# Patient Record
Sex: FEMALE | Race: WHITE | Employment: OTHER | URBAN - METROPOLITAN AREA
[De-identification: names, ages, dates, MRNs, and addresses within clinical notes are randomized per-mention and may not be internally consistent; named-entity substitution may affect disease eponyms.]

---

## 2017-11-07 ENCOUNTER — OFFICE VISIT (OUTPATIENT)
Dept: OBGYN CLINIC | Age: 47
End: 2017-11-07

## 2017-11-07 VITALS
SYSTOLIC BLOOD PRESSURE: 114 MMHG | BODY MASS INDEX: 21.26 KG/M2 | WEIGHT: 120 LBS | DIASTOLIC BLOOD PRESSURE: 76 MMHG | HEIGHT: 63 IN

## 2017-11-07 DIAGNOSIS — Z01.419 ENCOUNTER FOR GYNECOLOGICAL EXAMINATION WITHOUT ABNORMAL FINDING: Primary | ICD-10-CM

## 2017-11-07 NOTE — PROGRESS NOTES
Annual exam ages 40-58    Ul. Hebert Bedoya is a ,  56 y.o. female [facility] No LMP recorded. Patient has had an ablation. .    She presents for her annual checkup. She is having no significant problems. With regard to the Gardasil vaccine, she is older than the age for which it is FDA approved. Menstrual status:    Her periods are nonexistent in flow due to having an ablation. She denies dysmenorrhea. She reports no premenstrual symptoms. Contraception:    The current method of family planning is tubal ligation. Sexual history:    She  reports that she currently engages in sexual activity and has had male partners. She reports using the following method of birth control/protection: Surgical.    Medical conditions:    Since her last annual GYN exam about one year ago, she has not the following changes in her health history: none. Pap and Mammogram History:    Her most recent Pap smear was normal, obtained 1 year(s) ago. The patient had her mammogram today in our office. Breast Cancer History/Substance Abuse: negative    Osteoporosis History:    Family history does not include a first or second degree relative with osteopenia or osteoporosis. A bone density scan has not been obtained. Past Medical History:   Diagnosis Date    Amenorrhea      Past Surgical History:   Procedure Laterality Date    HX  SECTION      x2    HX HYSTEROSCOPY WITH ENDOMETRIAL ABLATION      w/ D&C    HX TUBAL LIGATION  09         Allergies: Review of patient's allergies indicates no known allergies. Tobacco History:  reports that she has never smoked. She has never used smokeless tobacco.  Alcohol Abuse:  reports that she does not drink alcohol. Drug Abuse:  reports that she does not use illicit drugs.     Family Medical/Cancer History:   Family History   Problem Relation Age of Onset    Diabetes Father      Type II        Review of Systems - History obtained from the patient  Constitutional: negative for weight loss, fever, night sweats  HEENT: negative for hearing loss, earache, congestion, snoring, sorethroat  CV: negative for chest pain, palpitations, edema  Resp: negative for cough, shortness of breath, wheezing  GI: negative for change in bowel habits, abdominal pain, black or bloody stools  : negative for frequency, dysuria, hematuria, vaginal discharge  MSK: negative for back pain, joint pain, muscle pain  Breast: negative for breast lumps, nipple discharge, galactorrhea  Skin :negative for itching, rash, hives  Neuro: negative for dizziness, headache, confusion, weakness  Psych: negative for anxiety, depression, change in mood  Heme/lymph: negative for bleeding, bruising, pallor    Physical Exam    Visit Vitals    /76    Ht 5' 3\" (1.6 m)    Wt 120 lb (54.4 kg)    BMI 21.26 kg/m2       Constitutional  · Appearance: well-nourished, well developed, alert, in no acute distress    HENT  · Head and Face: appears normal    Neck  · Inspection/Palpation: normal appearance, no masses or tenderness  · Lymph Nodes: no lymphadenopathy present  · Thyroid: gland size normal, nontender, no nodules or masses present on palpation    Chest  · Respiratory Effort: breathing unlabored  · Auscultation: normal breath sounds    Cardiovascular  · Heart:  · Auscultation: regular rate and rhythm without murmur    Breasts  · Inspection of Breasts: breasts symmetrical, no skin changes, no discharge present, nipple appearance normal, no skin retraction present  · Palpation of Breasts and Axillae: no masses present on palpation, no breast tenderness  · Axillary Lymph Nodes: no lymphadenopathy present    Gastrointestinal  · Abdominal Examination: abdomen non-tender to palpation, normal bowel sounds, no masses present  · Liver and spleen: no hepatomegaly present, spleen not palpable  · Hernias: no hernias identified    Genitourinary  · External Genitalia: normal appearance for age, no discharge present, no tenderness present, no inflammatory lesions present, no masses present, no atrophy present  · Vagina: normal vaginal vault without central or paravaginal defects, no discharge present, no inflammatory lesions present, no masses present  · Bladder: non-tender to palpation  · Urethra: appears normal  · Cervix: normal   · Uterus: normal size, shape and consistency  · Adnexa: no adnexal tenderness present, no adnexal masses present  · Perineum: perineum within normal limits, no evidence of trauma, no rashes or skin lesions present  · Anus: anus within normal limits, no hemorrhoids present  · Inguinal Lymph Nodes: no lymphadenopathy present    Skin  · General Inspection: no rash, no lesions identified    Neurologic/Psychiatric  · Mental Status:  · Orientation: grossly oriented to person, place and time  · Mood and Affect: mood normal, affect appropriate    Assessment:  Routine gynecologic examination  Her current medical status is satisfactory with no evidence of significant gynecologic issues.     Plan:  Counseled re: diet, exercise, healthy lifestyle  Return for yearly wellness visits  Rec annual mammogram

## 2017-11-07 NOTE — PATIENT INSTRUCTIONS

## 2018-12-26 NOTE — PROGRESS NOTES
Annual exam ages 40-58    Ul. Hebert Bedoya is a ,  53 y.o. female [facility] No LMP recorded. Patient has had an ablation. She presents for her annual checkup. She is having no significant problems. With regard to the Gardasil vaccine, she is older than the age for which it is FDA approved. Menstrual status:    Her periods are nonexistent in flow due to having an ablation. She denies dysmenorrhea. She reports no premenstrual symptoms. Contraception:    The current method of family planning is tubal ligation. Sexual history:    She  reports that she currently engages in sexual activity and has had partners who are Male. She reports using the following method of birth control/protection: Surgical.    Medical conditions:    Since her last annual GYN exam about one year ago, she has not the following changes in her health history: none. Pap and Mammogram History:    Her most recent Pap smear was normal, obtained 2 year(s) ago. The patient had her mammogram today in our office. Breast Cancer History/Substance Abuse: negative    Osteoporosis History:    Family history does not include a first or second degree relative with osteopenia or osteoporosis. A bone density scan has not been obtained. Past Medical History:   Diagnosis Date    Amenorrhea      Past Surgical History:   Procedure Laterality Date    HX  SECTION      x2    HX HYSTEROSCOPY WITH ENDOMETRIAL ABLATION      w/ D&C    HX TUBAL LIGATION  09         Allergies: Patient has no known allergies. Tobacco History:  reports that  has never smoked. she has never used smokeless tobacco.  Alcohol Abuse:  reports that she does not drink alcohol. Drug Abuse:  reports that she does not use drugs.     Family Medical/Cancer History:   Family History   Problem Relation Age of Onset    Diabetes Father         Type II        Review of Systems - History obtained from the patient  Constitutional: negative for weight loss, fever, night sweats  HEENT: negative for hearing loss, earache, congestion, snoring, sorethroat  CV: negative for chest pain, palpitations, edema  Resp: negative for cough, shortness of breath, wheezing  GI: negative for change in bowel habits, abdominal pain, black or bloody stools  : negative for frequency, dysuria, hematuria, vaginal discharge  MSK: negative for back pain, joint pain, muscle pain  Breast: negative for breast lumps, nipple discharge, galactorrhea  Skin :negative for itching, rash, hives  Neuro: negative for dizziness, headache, confusion, weakness  Psych: negative for anxiety, depression, change in mood  Heme/lymph: negative for bleeding, bruising, pallor    Physical Exam    Visit Vitals  /68   Ht 5' 3\" (1.6 m)   Wt 118 lb (53.5 kg)   BMI 20.90 kg/m²       Constitutional  · Appearance: well-nourished, well developed, alert, in no acute distress    HENT  · Head and Face: appears normal    Neck  · Inspection/Palpation: normal appearance, no masses or tenderness  · Lymph Nodes: no lymphadenopathy present  · Thyroid: gland size normal, nontender, no nodules or masses present on palpation    Chest  · Respiratory Effort: breathing unlabored  · Auscultation: normal breath sounds    Cardiovascular  · Heart:  · Auscultation: regular rate and rhythm without murmur    Breasts  · Inspection of Breasts: breasts symmetrical, no skin changes, no discharge present, nipple appearance normal, no skin retraction present  · Palpation of Breasts and Axillae: no masses present on palpation, no breast tenderness  · Axillary Lymph Nodes: no lymphadenopathy present    Gastrointestinal  · Abdominal Examination: abdomen non-tender to palpation, normal bowel sounds, no masses present  · Liver and spleen: no hepatomegaly present, spleen not palpable  · Hernias: no hernias identified    Genitourinary  · External Genitalia: normal appearance for age, no discharge present, no tenderness present, no inflammatory lesions present, no masses present, no atrophy present  · Vagina: normal vaginal vault without central or paravaginal defects, no discharge present, no inflammatory lesions present, no masses present  · Bladder: non-tender to palpation  · Urethra: appears normal  · Cervix: normal   · Uterus: normal size, shape and consistency  · Adnexa: no adnexal tenderness present, no adnexal masses present  · Perineum: perineum within normal limits, no evidence of trauma, no rashes or skin lesions present  · Anus: anus within normal limits, no hemorrhoids present  · Inguinal Lymph Nodes: no lymphadenopathy present    Skin  · General Inspection: no rash, no lesions identified    Neurologic/Psychiatric  · Mental Status:  · Orientation: grossly oriented to person, place and time  · Mood and Affect: mood normal, affect appropriate    Assessment:  Routine gynecologic examination  Her current medical status is satisfactory with no evidence of significant gynecologic issues.     Plan:  Counseled re: diet, exercise, healthy lifestyle  Return for yearly wellness visits  Rec annual mammogram

## 2019-01-02 ENCOUNTER — OFFICE VISIT (OUTPATIENT)
Dept: OBGYN CLINIC | Age: 49
End: 2019-01-02

## 2019-01-02 VITALS
WEIGHT: 118 LBS | BODY MASS INDEX: 20.91 KG/M2 | SYSTOLIC BLOOD PRESSURE: 108 MMHG | HEIGHT: 63 IN | DIASTOLIC BLOOD PRESSURE: 68 MMHG

## 2019-01-02 DIAGNOSIS — Z01.419 ENCOUNTER FOR GYNECOLOGICAL EXAMINATION WITHOUT ABNORMAL FINDING: Primary | ICD-10-CM

## 2019-01-02 NOTE — PATIENT INSTRUCTIONS

## 2020-02-27 NOTE — PROGRESS NOTES
Annual exam ages 40-58      Ul. Hebert Bedoya is a ,  51 y.o. female   No LMP recorded. Patient has had an ablation. She presents for her annual checkup. She is having no significant problems. Father  this year with ALS. Son going to college soon--? [employer]. With regard to the Gardasil vaccine, she is older than the FDA approved age to receive it. Menstrual status:    Her periods are nonexistent in flow due to having an ablation in the past.     She does not have dysmenorrhea. She reports no premenstrual symptoms. Contraception:    The current method of family planning is tubal ligation. Hormonal status:  She reports no perimenstrual type symptoms. She is not having vasomotor symptoms. The patient is not using any ERT. Sexual history:    She  reports being sexually active and has had partner(s) who are Male. She reports using the following method of birth control/protection: Surgical.    Medical conditions:    Since her last annual GYN exam about one year ago, she has not the following changes in her health history: none. Surgical history confirmed with patient. has a past surgical history that includes hx  section; hx hysteroscopy with endometrial ablation (); and hx tubal ligation (09). Pap and Mammogram History:    Her most recent Pap smear was normal, obtained 3 year(s) ago. The patient had her mammogram today in our office. Breast Cancer History/Substance Abuse: negative      Osteoporosis History:    Family history does not include a first or second degree relative with osteopenia or osteoporosis. A bone density scan has not been obtained     Past Medical History:   Diagnosis Date    Amenorrhea      Past Surgical History:   Procedure Laterality Date    HX  SECTION      x2    HX HYSTEROSCOPY WITH ENDOMETRIAL ABLATION      w/ D&C    HX TUBAL LIGATION  09         Allergies: Patient has no known allergies. Tobacco History:  reports that she has never smoked. She has never used smokeless tobacco.  Alcohol Abuse:  reports no history of alcohol use. Drug Abuse:  reports no history of drug use.     Family Medical/Cancer History:   Family History   Problem Relation Age of Onset    Diabetes Father         Type II        Review of Systems - History obtained from the patient  Constitutional: negative for weight loss, fever, night sweats  HEENT: negative for hearing loss, earache, congestion, snoring, sorethroat  CV: negative for chest pain, palpitations, edema  Resp: negative for cough, shortness of breath, wheezing  GI: negative for change in bowel habits, abdominal pain, black or bloody stools  : negative for frequency, dysuria, hematuria, vaginal discharge  MSK: negative for back pain, joint pain, muscle pain  Breast: negative for breast lumps, nipple discharge, galactorrhea  Skin :negative for itching, rash, hives  Neuro: negative for dizziness, headache, confusion, weakness  Psych: negative for anxiety, depression, change in mood  Heme/lymph: negative for bleeding, bruising, pallor    Physical Exam    Visit Vitals  /72   Ht 5' 3\" (1.6 m)   Wt 119 lb (54 kg)   BMI 21.08 kg/m²       Constitutional  · Appearance: well-nourished, well developed, alert, in no acute distress    HENT  · Head and Face: appears normal    Neck  · Inspection/Palpation: normal appearance, no masses or tenderness  · Lymph Nodes: no lymphadenopathy present  · Thyroid: gland size normal, nontender, no nodules or masses present on palpation    Chest  · Respiratory Effort: breathing unlabored  · Auscultation: normal breath sounds    Cardiovascular  · Heart:  · Auscultation: regular rate and rhythm without murmur    Breasts  · Inspection of Breasts: breasts symmetrical, no skin changes, no discharge present, nipple appearance normal, no skin retraction present  · Palpation of Breasts and Axillae: no masses present on palpation, no breast tenderness  · Axillary Lymph Nodes: no lymphadenopathy present    Gastrointestinal  · Abdominal Examination: abdomen non-tender to palpation, normal bowel sounds, no masses present  · Liver and spleen: no hepatomegaly present, spleen not palpable  · Hernias: no hernias identified    Genitourinary  · External Genitalia: normal appearance for age, no discharge present, no tenderness present, no inflammatory lesions present, no masses present, no atrophy present  · Vagina: normal vaginal vault without central or paravaginal defects, no discharge present, no inflammatory lesions present, no masses present  · Bladder: non-tender to palpation  · Urethra: appears normal  · Cervix: normal   · Uterus: normal size, shape and consistency  · Adnexa: no adnexal tenderness present, no adnexal masses present  · Perineum: perineum within normal limits, no evidence of trauma, no rashes or skin lesions present  · Anus: anus within normal limits, no hemorrhoids present  · Inguinal Lymph Nodes: no lymphadenopathy present    Skin  · General Inspection: no rash, no lesions identified    Neurologic/Psychiatric  · Mental Status:  · Orientation: grossly oriented to person, place and time  · Mood and Affect: mood normal, affect appropriate    Assessment:  Routine gynecologic examination  Her current medical status is satisfactory with no evidence of significant gynecologic issues.     Plan:  Counseled re: diet, exercise, healthy lifestyle  Return for yearly wellness visits  Rec annual mammogram

## 2020-03-04 ENCOUNTER — OFFICE VISIT (OUTPATIENT)
Dept: OBGYN CLINIC | Age: 50
End: 2020-03-04

## 2020-03-04 VITALS
WEIGHT: 119 LBS | BODY MASS INDEX: 21.09 KG/M2 | HEIGHT: 63 IN | SYSTOLIC BLOOD PRESSURE: 118 MMHG | DIASTOLIC BLOOD PRESSURE: 72 MMHG

## 2020-03-04 DIAGNOSIS — Z01.419 ENCOUNTER FOR GYNECOLOGICAL EXAMINATION WITHOUT ABNORMAL FINDING: Primary | ICD-10-CM

## 2020-03-08 LAB
CYTOLOGIST CVX/VAG CYTO: NORMAL
CYTOLOGY CVX/VAG DOC CYTO: NORMAL
CYTOLOGY CVX/VAG DOC THIN PREP: NORMAL
CYTOLOGY HISTORY:: NORMAL
DX ICD CODE: NORMAL
HPV I/H RISK 1 DNA CVX QL PROBE+SIG AMP: NEGATIVE
Lab: NORMAL
OTHER STN SPEC: NORMAL
STAT OF ADQ CVX/VAG CYTO-IMP: NORMAL

## 2021-03-15 NOTE — PROGRESS NOTES
Annual exam ages 40-58      Ul. Hebert Bedoya is a ,  53 y.o. female   No LMP recorded. Patient has had an ablation. She presents for her annual checkup. She is having hot flashes and believes she is in menopause. With regard to the Gardasil vaccine, she is older than the FDA approved age to receive it. Menstrual status:    Her periods are absent in flow due to having an ablation in the past.     She reports no premenstrual symptoms. Contraception:    The current method of family planning is tubal ligation. Hormonal status:  She reports no perimenstrual type symptoms. She is not having vasomotor symptoms. The patient is not using any ERT. Sexual history:    She  reports being sexually active and has had partner(s) who are Male. She reports using the following method of birth control/protection: Surgical.    Medical conditions:    Since her last annual GYN exam about one year ago, she has not the following changes in her health history: none. Surgical history confirmed with patient. has a past surgical history that includes hx  section; hx hysteroscopy with endometrial ablation (); and hx tubal ligation (09). Pap and Mammogram History:    Her most recent Pap smear was normal, obtained 1 year(s) ago. The patient had her mammogram today in our office. Breast Cancer History/Substance Abuse: negative      Osteoporosis History:    Family history does not include a first or second degree relative with osteopenia or osteoporosis. A bone density scan has not been obtained    Past Medical History:   Diagnosis Date    Amenorrhea      Past Surgical History:   Procedure Laterality Date    HX  SECTION      x2    HX HYSTEROSCOPY WITH ENDOMETRIAL ABLATION      w/ D&C    HX TUBAL LIGATION  09         Allergies: Patient has no known allergies. Tobacco History:  reports that she has never smoked.  She has never used smokeless tobacco.  Alcohol Abuse:  reports no history of alcohol use. Drug Abuse:  reports no history of drug use.     Family Medical/Cancer History:   Family History   Problem Relation Age of Onset    Diabetes Father         Type II        Review of Systems - History obtained from the patient  Constitutional: negative for weight loss, fever, night sweats  HEENT: negative for hearing loss, earache, congestion, snoring, sorethroat  CV: negative for chest pain, palpitations, edema  Resp: negative for cough, shortness of breath, wheezing  GI: negative for change in bowel habits, abdominal pain, black or bloody stools  : negative for frequency, dysuria, hematuria, vaginal discharge  MSK: negative for back pain, joint pain, muscle pain  Breast: negative for breast lumps, nipple discharge, galactorrhea  Skin :negative for itching, rash, hives  Neuro: negative for dizziness, headache, confusion, weakness  Psych: negative for anxiety, depression, change in mood  Heme/lymph: negative for bleeding, bruising, pallor    Physical Exam    Visit Vitals  /80   Ht 5' 3\" (1.6 m)   Wt 123 lb (55.8 kg)   BMI 21.79 kg/m²       Constitutional  · Appearance: well-nourished, well developed, alert, in no acute distress    HENT  · Head and Face: appears normal    Neck  · Inspection/Palpation: normal appearance, no masses or tenderness  · Lymph Nodes: no lymphadenopathy present  · Thyroid: gland size normal, nontender, no nodules or masses present on palpation    Chest  · Respiratory Effort: breathing unlabored  · Auscultation: normal breath sounds    Cardiovascular  · Heart:  · Auscultation: regular rate and rhythm without murmur    Breasts  · Inspection of Breasts: breasts symmetrical, no skin changes, no discharge present, nipple appearance normal, no skin retraction present  · Palpation of Breasts and Axillae: no masses present on palpation, no breast tenderness  · Axillary Lymph Nodes: no lymphadenopathy present    Gastrointestinal  · Abdominal Examination: abdomen non-tender to palpation, normal bowel sounds, no masses present  · Liver and spleen: no hepatomegaly present, spleen not palpable  · Hernias: no hernias identified    Genitourinary  · External Genitalia: normal appearance for age, no discharge present, no tenderness present, no inflammatory lesions present, no masses present, no atrophy present  · Vagina: normal vaginal vault without central or paravaginal defects, no discharge present, no inflammatory lesions present, no masses present  · Bladder: non-tender to palpation  · Urethra: appears normal  · Cervix: normal   · Uterus: normal size, shape and consistency  · Adnexa: no adnexal tenderness present, no adnexal masses present  · Perineum: perineum within normal limits, no evidence of trauma, no rashes or skin lesions present  · Anus: anus within normal limits, no hemorrhoids present  · Inguinal Lymph Nodes: no lymphadenopathy present    Skin  · General Inspection: no rash, no lesions identified    Neurologic/Psychiatric  · Mental Status:  · Orientation: grossly oriented to person, place and time  · Mood and Affect: mood normal, affect appropriate    Assessment:  Routine gynecologic examination  Her current medical status is satisfactory with no evidence of significant gynecologic issues.     Plan:  Counseled re: diet, exercise, healthy lifestyle  Return for yearly wellness visits  Rec annual mammogram

## 2021-03-17 ENCOUNTER — TELEPHONE (OUTPATIENT)
Dept: OBGYN CLINIC | Age: 51
End: 2021-03-17

## 2021-03-17 NOTE — TELEPHONE ENCOUNTER
Patient is calling to verify if she had her covid vaccine that she can still get her Mammo if it was given and no known side effects. Yes, unless swollen lymph nodes, should be fine to pursue.

## 2021-03-18 ENCOUNTER — OFFICE VISIT (OUTPATIENT)
Dept: OBGYN CLINIC | Age: 51
End: 2021-03-18

## 2021-03-18 VITALS
DIASTOLIC BLOOD PRESSURE: 80 MMHG | SYSTOLIC BLOOD PRESSURE: 116 MMHG | WEIGHT: 123 LBS | HEIGHT: 63 IN | BODY MASS INDEX: 21.79 KG/M2

## 2021-03-18 DIAGNOSIS — Z01.419 ENCOUNTER FOR GYNECOLOGICAL EXAMINATION WITHOUT ABNORMAL FINDING: Primary | ICD-10-CM

## 2021-03-18 PROCEDURE — 99396 PREV VISIT EST AGE 40-64: CPT | Performed by: OBSTETRICS & GYNECOLOGY

## 2021-03-18 NOTE — PATIENT INSTRUCTIONS
Well Visit, Ages 25 to 48: Care Instructions Your Care Instructions Physical exams can help you stay healthy. Your doctor has checked your overall health and may have suggested ways to take good care of yourself. He or she also may have recommended tests. At home, you can help prevent illness with healthy eating, regular exercise, and other steps. Follow-up care is a key part of your treatment and safety. Be sure to make and go to all appointments, and call your doctor if you are having problems. It's also a good idea to know your test results and keep a list of the medicines you take. How can you care for yourself at home? · Reach and stay at a healthy weight. This will lower your risk for many problems, such as obesity, diabetes, heart disease, and high blood pressure. · Get at least 30 minutes of physical activity on most days of the week. Walking is a good choice. You also may want to do other activities, such as running, swimming, cycling, or playing tennis or team sports. Discuss any changes in your exercise program with your doctor. · Do not smoke or allow others to smoke around you. If you need help quitting, talk to your doctor about stop-smoking programs and medicines. These can increase your chances of quitting for good. · Talk to your doctor about whether you have any risk factors for sexually transmitted infections (STIs). Having one sex partner (who does not have STIs and does not have sex with anyone else) is a good way to avoid these infections. · Use birth control if you do not want to have children at this time. Talk with your doctor about the choices available and what might be best for you. · Protect your skin from too much sun. When you're outdoors from 10 a.m. to 4 p.m., stay in the shade or cover up with clothing and a hat with a wide brim. Wear sunglasses that block UV rays. Even when it's cloudy, put broad-spectrum sunscreen (SPF 30 or higher) on any exposed skin.  
· See a dentist one or two times a year for checkups and to have your teeth cleaned. · Wear a seat belt in the car. Follow your doctor's advice about when to have certain tests. These tests can spot problems early. For everyone · Cholesterol. Have the fat (cholesterol) in your blood tested after age [address]. Your doctor will tell you how often to have this done based on your age, family history, or other things that can increase your risk for heart disease. · Blood pressure. Have your blood pressure checked during a routine doctor visit. Your doctor will tell you how often to check your blood pressure based on your age, your blood pressure results, and other factors. · Vision. Talk with your doctor about how often to have a glaucoma test. 
· Diabetes. Ask your doctor whether you should have tests for diabetes. · Colon cancer. Your risk for colorectal cancer gets higher as you get older. Some experts say that adults should start regular screening at age 48 and stop at age 76. Others say to start before age 48 or continue after age 76. Talk with your doctor about your risk and when to start and stop screening. For women · Breast exam and mammogram. Talk to your doctor about when you should have a clinical breast exam and a mammogram. Medical experts differ on whether and how often women under 50 should have these tests. Your doctor can help you decide what is right for you. · Cervical cancer screening test and pelvic exam. Begin with a Pap test at age 24. The test often is part of a pelvic exam. Starting at age 27, you may choose to have a Pap test, an HPV test, or both tests at the same time (called co-testing). Talk with your doctor about how often to have testing. · Tests for sexually transmitted infections (STIs). Ask whether you should have tests for STIs. You may be at risk if you have sex with more than one person, especially if your partners do not wear condoms. For men · Tests for sexually transmitted infections (STIs). Ask whether you should have tests for STIs. You may be at risk if you have sex with more than one person, especially if you do not wear a condom. · Testicular cancer exam. Ask your doctor whether you should check your testicles regularly. · Prostate exam. Talk to your doctor about whether you should have a blood test (called a PSA test) for prostate cancer. Experts differ on whether and when men should have this test. Some experts suggest it if you are older than 39 and are -American or have a father or brother who got prostate cancer when he was younger than 72. When should you call for help? Watch closely for changes in your health, and be sure to contact your doctor if you have any problems or symptoms that concern you. Where can you learn more? Go to http://www.mckinley.com/ Enter P072 in the search box to learn more about \"Well Visit, Ages 25 to 48: Care Instructions. \" Current as of: May 27, 2020               Content Version: 12.6 © 2006-2020 [employer]. Care instructions adapted under license by [employer] (which disclaims liability or warranty for this information). If you have questions about a medical condition or this instruction, always ask your healthcare professional. [place] [address] any warranty or liability for your use of this information.

## 2022-04-12 NOTE — PROGRESS NOTES
Annual exam ages 40-58      Ul. Hebert Bedoya is a ,  49 y.o. female   No LMP recorded. Patient has had an ablation. She presents for her annual checkup. Son at 21 with ITP. Had very low platelets. She is having no significant problems. With regard to the Gardasil vaccine, she is older than the FDA approved age to receive it. Menstrual status:    Her periods are absent in flow due to ablation. Contraception:    The current method of family planning is tubal ligation. Hormonal status:  She reports no perimenstrual type symptoms. She is not having vasomotor symptoms. The patient is not using any ERT. Sexual history:    She  reports being sexually active and has had partner(s) who are male. She reports using the following method of birth control/protection: Surgical.    Medical conditions:    Since her last annual GYN exam about one year ago, she has not the following changes in her health history: none. Surgical history confirmed with patient. has a past surgical history that includes hx  section; hx hysteroscopy with endometrial ablation (); and hx tubal ligation (09). Pap and Mammogram History:    Her most recent Pap smear was normal, obtained 2 year(s) ago. The patient had her mammogram today in our office. Breast Cancer History/Substance Abuse: negative      Osteoporosis History:    Family history does not include a first or second degree relative with osteopenia or osteoporosis. Past Medical History:   Diagnosis Date    Amenorrhea      Past Surgical History:   Procedure Laterality Date    HX  SECTION      x2    HX HYSTEROSCOPY WITH ENDOMETRIAL ABLATION      w/ D&C    HX TUBAL LIGATION  09         Allergies: Patient has no known allergies. Tobacco History:  reports that she has never smoked. She has never used smokeless tobacco.  Alcohol Abuse:  reports no history of alcohol use.   Drug Abuse:  reports no history of drug use.    Family Medical/Cancer History:   Family History   Problem Relation Age of Onset    Diabetes Father         Type II        Review of Systems - History obtained from the patient  Constitutional: negative for weight loss, fever, night sweats  HEENT: negative for hearing loss, earache, congestion, snoring, sorethroat  CV: negative for chest pain, palpitations, edema  Resp: negative for cough, shortness of breath, wheezing  GI: negative for change in bowel habits, abdominal pain, black or bloody stools  : negative for frequency, dysuria, hematuria, vaginal discharge  MSK: negative for back pain, joint pain, muscle pain  Breast: negative for breast lumps, nipple discharge, galactorrhea  Skin :negative for itching, rash, hives  Neuro: negative for dizziness, headache, confusion, weakness  Psych: negative for anxiety, depression, change in mood  Heme/lymph: negative for bleeding, bruising, pallor    Physical Exam    There were no vitals taken for this visit.     Constitutional  · Appearance: well-nourished, well developed, alert, in no acute distress    HENT  · Head and Face: appears normal    Neck  · Inspection/Palpation: normal appearance, no masses or tenderness  · Lymph Nodes: no lymphadenopathy present  · Thyroid: gland size normal, nontender, no nodules or masses present on palpation    Chest  · Respiratory Effort: breathing unlabored  · Auscultation: normal breath sounds    Cardiovascular  · Heart:  · Auscultation: regular rate and rhythm without murmur    Breasts  · Inspection of Breasts: breasts symmetrical, no skin changes, no discharge present, nipple appearance normal, no skin retraction present  · Palpation of Breasts and Axillae: no masses present on palpation, no breast tenderness  · Axillary Lymph Nodes: no lymphadenopathy present    Gastrointestinal  · Abdominal Examination: abdomen non-tender to palpation, normal bowel sounds, no masses present  · Liver and spleen: no hepatomegaly present, spleen not palpable  · Hernias: no hernias identified    Genitourinary  · External Genitalia: normal appearance for age, no discharge present, no tenderness present, no inflammatory lesions present, no masses present, no atrophy present  · Vagina: normal vaginal vault without central or paravaginal defects, no discharge present, no inflammatory lesions present, no masses present  · Bladder: non-tender to palpation  · Urethra: appears normal  · Cervix: normal   · Uterus: normal size, shape and consistency  · Adnexa: no adnexal tenderness present, no adnexal masses present  · Perineum: perineum within normal limits, no evidence of trauma, no rashes or skin lesions present  · Anus: anus within normal limits, no hemorrhoids present  · Inguinal Lymph Nodes: no lymphadenopathy present    Skin  · General Inspection: no rash, no lesions identified    Neurologic/Psychiatric  · Mental Status:  · Orientation: grossly oriented to person, place and time  · Mood and Affect: mood normal, affect appropriate    Assessment:  Routine gynecologic examination  Her current medical status is satisfactory with no evidence of significant gynecologic issues.     Plan:  Counseled re: diet, exercise, healthy lifestyle  Return for yearly wellness visits  Rec annual mammogram

## 2022-04-14 ENCOUNTER — OFFICE VISIT (OUTPATIENT)
Dept: OBGYN CLINIC | Age: 52
End: 2022-04-14

## 2022-04-14 VITALS — BODY MASS INDEX: 21.75 KG/M2 | DIASTOLIC BLOOD PRESSURE: 78 MMHG | SYSTOLIC BLOOD PRESSURE: 114 MMHG | WEIGHT: 122.8 LBS

## 2022-04-14 DIAGNOSIS — Z01.419 ENCOUNTER FOR GYNECOLOGICAL EXAMINATION WITHOUT ABNORMAL FINDING: Primary | ICD-10-CM

## 2022-04-14 PROCEDURE — 99396 PREV VISIT EST AGE 40-64: CPT | Performed by: OBSTETRICS & GYNECOLOGY

## 2023-04-28 ENCOUNTER — OFFICE VISIT (OUTPATIENT)
Dept: OBGYN CLINIC | Age: 53
End: 2023-04-28

## 2023-04-28 VITALS — BODY MASS INDEX: 21.61 KG/M2 | DIASTOLIC BLOOD PRESSURE: 82 MMHG | SYSTOLIC BLOOD PRESSURE: 122 MMHG | WEIGHT: 122 LBS

## 2023-04-28 DIAGNOSIS — Z12.4 CERVICAL CANCER SCREENING: ICD-10-CM

## 2023-04-28 DIAGNOSIS — Z01.419 ENCOUNTER FOR GYNECOLOGICAL EXAMINATION WITHOUT ABNORMAL FINDING: Primary | ICD-10-CM

## 2023-04-28 PROCEDURE — 99396 PREV VISIT EST AGE 40-64: CPT | Performed by: OBSTETRICS & GYNECOLOGY

## 2023-04-28 RX ORDER — PROGESTERONE 200 MG/1
200 CAPSULE ORAL DAILY
COMMUNITY

## 2023-04-28 NOTE — PROGRESS NOTES
Latasha Marte is a 47 y.o. female returns for an annual exam     Chief Complaint   Patient presents with    Well Woman       No LMP recorded. Patient is postmenopausal.  Problems:  She states she saw someone at [facility] on [facility] July 2022 for HRT and was prescribed progesterone. She read that doing HRT increases risk for breast cancer and would like to discuss. Birth Control: tubal ligation. Last Pap: normal obtained 3 year(s) ago on 3/4/20. She does not have a history of KADEN 2, 3 or cervical cancer. Last Mammogram: had her mammogram today in our office. Last Bone Density: She has never had one. Last colonoscopy: Her very first one scheduled 5/5/23    Increases risk of Breast cancer? Doing hormone pellets     Had labs done for menopause [facility]/2022 [facility].     1. Have you been to the ER, urgent care clinic, or hospitalized since your last visit? No    2. Have you seen or consulted any other health care providers outside of the [address] [place] [address] since your last visit?   yes    Examination chaperoned by Geno Evans MA.

## 2023-04-28 NOTE — PROGRESS NOTES
Annual exam post menopause  Shanita Mc is a ,  47 y.o. female   No LMP recorded. Patient is postmenopausal.    She presents for her annual checkup. She is having concerns about ERT, which she started to help with sleep, energy and muscle mass. She states she saw someone at [facility] on [facility] 2022 for HRT and was prescribed progesterone. She read that doing HRT increases risk for breast cancer and would like to discuss. Menstrual status:    Doing hormone pellets with nightly Progesterone. Sexual history:    She  reports being sexually active and has had partner(s) who are male. She reports using the following method of birth control/protection: Surgical.    Per Nursing Note:  Last colonoscopy: Her very first one scheduled 23  She does not have a history of KADEN 2, 3 or cervical cancer. Last Mammogram: had her mammogram today in our office. Last Bone Density: She has never had one. Past Medical History:   Diagnosis Date    Amenorrhea      Past Surgical History:   Procedure Laterality Date    HX  SECTION      x2    HX HYSTEROSCOPY WITH ENDOMETRIAL ABLATION      w/ D&C    HX TUBAL LIGATION  09       Current Outpatient Medications   Medication Sig Dispense Refill    progesterone (PROMETRIUM) 200 mg capsule Take 1 Capsule by mouth daily. Allergies: Patient has no known allergies. Tobacco History:  reports that she has never smoked. She has never used smokeless tobacco.  Alcohol Abuse:  reports no history of alcohol use. Drug Abuse:  reports no history of drug use.     Family Medical/Cancer History:   Family History   Problem Relation Age of Onset    Diabetes Father         Type II        Review of Systems - History obtained from the patient  Constitutional: negative for weight loss, fever, night sweats  HEENT: negative for hearing loss, earache, congestion, snoring, sorethroat  CV: negative for chest pain, palpitations, edema  Resp: negative for cough, shortness of breath, wheezing  GI: negative for change in bowel habits, abdominal pain, black or bloody stools  : negative for frequency, dysuria, hematuria, vaginal discharge  MSK: negative for back pain, joint pain, muscle pain  Breast: negative for breast lumps, nipple discharge, galactorrhea  Skin :negative for itching, rash, hives  Neuro: negative for dizziness, headache, confusion, weakness  Psych: negative for anxiety, depression, change in mood  Heme/lymph: negative for bleeding, bruising, pallor    Physical Exam    Visit Vitals  /82   Wt 122 lb (55.3 kg)   BMI 21.61 kg/m²       Constitutional  Appearance: well-nourished, well developed, alert, in no acute distress    HENT  Head and Face: appears normal    Neck  Inspection/Palpation: normal appearance, no masses or tenderness  Lymph Nodes: no lymphadenopathy present  Thyroid: gland size normal, nontender, no nodules or masses present on palpation    Chest  Respiratory Effort: breathing unlabored  Auscultation: normal breath sounds    Cardiovascular  Heart:   Auscultation: regular rate and rhythm without murmur    Breasts  Inspection of Breasts: breasts symmetrical, no skin changes, no discharge present, nipple appearance normal, no skin retraction present  Palpation of Breasts and Axillae: no masses present on palpation, no breast tenderness  Axillary Lymph Nodes: no lymphadenopathy present    Gastrointestinal  Abdominal Examination: abdomen non-tender to palpation, normal bowel sounds, no masses present  Liver and spleen: no hepatomegaly present, spleen not palpable  Hernias: no hernias identified    Genitourinary  External Genitalia: atrophy present, otherwise normal appearance for age, no discharge present, no tenderness present, no inflammatory lesions present, no masses present  Vagina: atrophic vaginal vault without central or paravaginal defects, no discharge present, no inflammatory lesions present, no masses present  Bladder: non-tender to palpation  Urethra: appears normal  Cervix: normal   Uterus: normal size, shape and consistency  Adnexa: no adnexal tenderness present, no adnexal masses present  Perineum: perineum within normal limits, no evidence of trauma, no rashes or skin lesions present  Anus: anus within normal limits, no hemorrhoids present  Inguinal Lymph Nodes: no lymphadenopathy present    Skin  General Inspection: no rash, no lesions identified    Neurologic/Psychiatric  Mental Status:  Orientation: grossly oriented to person, place and time  Mood and Affect: mood normal, affect appropriate    Assessment:  Routine gynecologic examination  Her current medical status is satisfactory with no evidence of significant gynecologic issues. Discussed RBA of HRT.     Plan:  Counseled re: diet, exercise, healthy lifestyle  Return for yearly wellness visits  Gardisil counseling provided  Pt counseled regarding co-testing for high risk HPV with pap  Rec screening mammo at either 36 or 41

## 2023-05-05 LAB
CYTOLOGIST CVX/VAG CYTO: NORMAL
CYTOLOGY CVX/VAG DOC CYTO: NORMAL
CYTOLOGY CVX/VAG DOC THIN PREP: NORMAL
CYTOLOGY HISTORY:: NORMAL
DX ICD CODE: NORMAL
HPV GENOTYPE REFLEX: NORMAL
HPV I/H RISK 4 DNA CVX QL PROBE+SIG AMP: NEGATIVE
Lab: NORMAL
OTHER STN SPEC: NORMAL
STAT OF ADQ CVX/VAG CYTO-IMP: NORMAL

## 2024-04-30 NOTE — PROGRESS NOTES
Gena Tarango is a 53 y.o. female returns for an annual exam     No chief complaint on file.      No LMP recorded.  Her periods are absent in flow   Problems: no problems  Birth Control: tubal ligation.  Last Pap: normal obtained 1 year(s) ago.  She does not have a history of CAMERON 2, 3 or cervical cancer.   Last Mammogram: had her mammogram today in our office.      Last colonoscopy: normal obtained 1 year(s) ago.      1. Have you been to the ER, urgent care clinic, or hospitalized since your last visit? No    2. Have you seen or consulted any other health care providers outside of the [facility] since your last visit? No    Examination chaperoned by Fiorella August LPN.

## 2024-05-01 ENCOUNTER — OFFICE VISIT (OUTPATIENT)
Age: 54
End: 2024-05-01
Payer: COMMERCIAL

## 2024-05-01 VITALS — DIASTOLIC BLOOD PRESSURE: 81 MMHG | WEIGHT: 115 LBS | SYSTOLIC BLOOD PRESSURE: 121 MMHG | BODY MASS INDEX: 20.37 KG/M2

## 2024-05-01 DIAGNOSIS — Z01.419 ENCOUNTER FOR GYNECOLOGICAL EXAMINATION (GENERAL) (ROUTINE) WITHOUT ABNORMAL FINDINGS: Primary | ICD-10-CM

## 2024-05-01 PROCEDURE — 99396 PREV VISIT EST AGE 40-64: CPT | Performed by: OBSTETRICS & GYNECOLOGY

## 2024-05-01 RX ORDER — PROGESTERONE 200 MG/1
CAPSULE ORAL
COMMUNITY

## 2024-05-01 SDOH — ECONOMIC STABILITY: INCOME INSECURITY: HOW HARD IS IT FOR YOU TO PAY FOR THE VERY BASICS LIKE FOOD, HOUSING, MEDICAL CARE, AND HEATING?: NOT HARD AT ALL

## 2024-05-01 SDOH — ECONOMIC STABILITY: HOUSING INSECURITY
IN THE LAST 12 MONTHS, WAS THERE A TIME WHEN YOU DID NOT HAVE A STEADY PLACE TO SLEEP OR SLEPT IN A SHELTER (INCLUDING NOW)?: NO

## 2024-05-01 SDOH — ECONOMIC STABILITY: FOOD INSECURITY: WITHIN THE PAST 12 MONTHS, THE FOOD YOU BOUGHT JUST DIDN'T LAST AND YOU DIDN'T HAVE MONEY TO GET MORE.: NEVER TRUE

## 2024-05-01 SDOH — ECONOMIC STABILITY: FOOD INSECURITY: WITHIN THE PAST 12 MONTHS, YOU WORRIED THAT YOUR FOOD WOULD RUN OUT BEFORE YOU GOT MONEY TO BUY MORE.: NEVER TRUE

## 2024-05-01 ASSESSMENT — PATIENT HEALTH QUESTIONNAIRE - PHQ9
SUM OF ALL RESPONSES TO PHQ QUESTIONS 1-9: 0
1. LITTLE INTEREST OR PLEASURE IN DOING THINGS: NOT AT ALL
SUM OF ALL RESPONSES TO PHQ QUESTIONS 1-9: 0
2. FEELING DOWN, DEPRESSED OR HOPELESS: NOT AT ALL
SUM OF ALL RESPONSES TO PHQ9 QUESTIONS 1 & 2: 0

## 2024-05-01 NOTE — PROGRESS NOTES
Annual exam post menopause      Gena Tarango is a ,  53 y.o. female   No LMP recorded. Patient has had an ablation.    She presents for her annual checkup.     She is having no problems.    Menstrual status:  The patient is not having menses.    Hormonal status:  She reports no perimenstrual type symptoms.   She is not having vasomotor symptoms.  The patient is using oral progesterone for HRT.     Sexual history:    She  reports being sexually active and has had partner(s) who are male. She reports using the following method of birth control/protection: Surgical.    Per Nursing Note:  Last Pap: normal obtained 1 year(s) ago.  She does not have a history of CAMERON 2, 3 or cervical cancer.   Last Mammogram: had her mammogram today in our office.       Last colonoscopy: normal obtained 1 year(s) ago.    Past Medical History:   Diagnosis Date    Amenorrhea      Past Surgical History:   Procedure Laterality Date     SECTION      x2    ENDOMETRIAL ABLATION      w/ D&C    TUBAL LIGATION  09       Current Outpatient Medications   Medication Sig Dispense Refill    progesterone (PROMETRIUM) 200 MG CAPS capsule TAKE 1 CAPSULE BY MOUTH EVERY DAY AT BEDTIME       No current facility-administered medications for this visit.     Allergies: Patient has no known allergies.     Tobacco History:  reports that she has never smoked. She has never used smokeless tobacco.  Alcohol Abuse:  reports no history of alcohol use.  Drug Abuse:  reports no history of drug use.    Family Medical/Cancer History:   Family History   Problem Relation Age of Onset    Diabetes Father         Type II        Review of Systems - History obtained from the patient  Constitutional: negative for weight loss, fever, night sweats  HEENT: negative for hearing loss, earache, congestion, snoring, sorethroat  CV: negative for chest pain, palpitations, edema  Resp: negative for cough, shortness of breath, wheezing  GI: negative for change in

## 2024-05-01 NOTE — PROGRESS NOTES
No LMP recorded.  Her periods are absent in flow   Problems: no problems  Birth Control: tubal ligation.  Last Pap: normal obtained 1 year(s) ago.  She does not have a history of CAMERON 2, 3 or cervical cancer.   Last Mammogram: had her mammogram today in our office.       Last colonoscopy: normal obtained 1 year(s) ago.

## 2025-03-17 ENCOUNTER — TRANSCRIBE ORDERS (OUTPATIENT)
Facility: HOSPITAL | Age: 55
End: 2025-03-17

## 2025-03-17 DIAGNOSIS — Z12.31 ENCOUNTER FOR SCREENING MAMMOGRAM FOR HIGH-RISK PATIENT: Primary | ICD-10-CM

## 2025-05-02 ENCOUNTER — HOSPITAL ENCOUNTER (OUTPATIENT)
Facility: HOSPITAL | Age: 55
Discharge: HOME | End: 2025-05-02
Payer: COMMERCIAL

## 2025-05-02 ENCOUNTER — OFFICE VISIT (OUTPATIENT)
Age: 55
End: 2025-05-02
Payer: COMMERCIAL

## 2025-05-02 VITALS — BODY MASS INDEX: 20.73 KG/M2 | HEIGHT: 63 IN | WEIGHT: 117 LBS

## 2025-05-02 VITALS
WEIGHT: 117.6 LBS | BODY MASS INDEX: 20.84 KG/M2 | HEART RATE: 75 BPM | HEIGHT: 63 IN | SYSTOLIC BLOOD PRESSURE: 123 MMHG | DIASTOLIC BLOOD PRESSURE: 79 MMHG | RESPIRATION RATE: 16 BRPM

## 2025-05-02 DIAGNOSIS — Z79.890 HORMONE REPLACEMENT THERAPY (HRT): ICD-10-CM

## 2025-05-02 DIAGNOSIS — Z12.31 ENCOUNTER FOR SCREENING MAMMOGRAM FOR HIGH-RISK PATIENT: ICD-10-CM

## 2025-05-02 DIAGNOSIS — Z01.419 WELL WOMAN EXAM WITH ROUTINE GYNECOLOGICAL EXAM: Primary | ICD-10-CM

## 2025-05-02 PROCEDURE — 99396 PREV VISIT EST AGE 40-64: CPT | Performed by: OBSTETRICS & GYNECOLOGY

## 2025-05-02 PROCEDURE — 77063 BREAST TOMOSYNTHESIS BI: CPT

## 2025-05-02 RX ORDER — VALACYCLOVIR HYDROCHLORIDE 1 G/1
1000 TABLET, FILM COATED ORAL 2 TIMES DAILY
COMMUNITY
Start: 2025-01-27 | End: 2025-02-06

## 2025-05-02 NOTE — PROGRESS NOTES
ANNUAL EXAM AGES 40-64      History:  Gena Tarango is a 54 y.o. year old  White (non-) female   No LMP recorded. Patient has had an ablation.    She presents for her annual checkup.     Nurse Notes:  No LMP recorded. Patient has had an ablation.  Her periods are absent in flow.  Problems: no problems  Birth Control: tubal ligation.  Last Pap: normal obtained 2023.  She does not have a history of CAMERON 2, 3 or cervical cancer.   Last Mammogram: mammogram today in the hospital.    Last Bone Density:  not obtained.  Last colonoscopy: normal obtained 2 year(s) ago.    History of Present Illness  The patient is a 54-year-old female who presents for a routine checkup.    Menopausal symptoms are managed with pellet therapy, which is reported as effective. A regimen of estrogen and testosterone is administered every 3 months, along with progesterone pills. Overall good health is reported. Postmenopausal status was revealed through blood work conducted by Len[facility]. Two episodes of night sweats were experienced, but no other symptoms were noted. Pellet therapy has been utilized since  with no adverse effects. An active lifestyle is maintained, engaging in tennis and pickleball.    A mammogram was performed today. The last colonoscopy was conducted 10 years ago, with no abnormalities detected. The first dermatology consultation occurred this year. No bowel or bladder issues are reported. Detox tea is consumed nightly, aiding in regular bowel movements. No discharges or infections are reported.    FAMILY HISTORY  Her mother  from cancer. Her father had ALS.    Problem List:  Patient Active Problem List    Diagnosis Date Noted    Amenorrhea      Past Medical History:   Diagnosis Date    Amenorrhea      Past Surgical History:   Procedure Laterality Date     SECTION      x2    ENDOMETRIAL ABLATION      w/ D&C    TUBAL LIGATION  09       OB History

## 2025-05-02 NOTE — PROGRESS NOTES
Gena Tarango is a 54 y.o. female returns for an annual exam     Chief Complaint   Patient presents with    Annual Exam       No LMP recorded. Patient has had an ablation.  Her periods are absent in flow.  Problems: no problems  Birth Control: tubal ligation.  Last Pap: normal obtained 4/28/2023.  She does not have a history of CAMERON 2, 3 or cervical cancer.   Last Mammogram: mammogram today in the hospital.    Last Bone Density:  not obtained.  Last colonoscopy: normal obtained 2 year(s) ago.      1. Have you been to the ER, urgent care clinic, or hospitalized since your last visit? Yes    2. Have you seen or consulted any other health care providers outside of the [facility] since your last visit? No    Examination chaperoned by TRESSA ASTUDILLO CMA.

## 2025-05-12 ENCOUNTER — RESULTS FOLLOW-UP (OUTPATIENT)
Age: 55
End: 2025-05-12

## 2025-05-12 LAB
CYTOLOGIST CVX/VAG CYTO: NORMAL
CYTOLOGY CVX/VAG DOC CYTO: NORMAL
CYTOLOGY CVX/VAG DOC THIN PREP: NORMAL
DX ICD CODE: NORMAL
HPV GENOTYPE REFLEX: NORMAL
HPV I/H RISK 4 DNA CVX QL PROBE+SIG AMP: NEGATIVE
OTHER STN SPEC: NORMAL
SERVICE CMNT-IMP: NORMAL
STAT OF ADQ CVX/VAG CYTO-IMP: NORMAL